# Patient Record
Sex: FEMALE
[De-identification: names, ages, dates, MRNs, and addresses within clinical notes are randomized per-mention and may not be internally consistent; named-entity substitution may affect disease eponyms.]

---

## 2021-10-21 ENCOUNTER — TRANSCRIPTION ENCOUNTER (OUTPATIENT)
Age: 61
End: 2021-10-21

## 2024-03-29 PROBLEM — Z00.00 ENCOUNTER FOR PREVENTIVE HEALTH EXAMINATION: Status: ACTIVE | Noted: 2024-03-29

## 2024-04-01 ENCOUNTER — APPOINTMENT (OUTPATIENT)
Dept: OBGYN | Facility: CLINIC | Age: 64
End: 2024-04-01
Payer: COMMERCIAL

## 2024-04-01 VITALS
SYSTOLIC BLOOD PRESSURE: 128 MMHG | DIASTOLIC BLOOD PRESSURE: 80 MMHG | HEIGHT: 63 IN | WEIGHT: 155 LBS | BODY MASS INDEX: 27.46 KG/M2

## 2024-04-01 DIAGNOSIS — Z01.419 ENCOUNTER FOR GYNECOLOGICAL EXAMINATION (GENERAL) (ROUTINE) W/OUT ABNORMAL FINDINGS: ICD-10-CM

## 2024-04-01 DIAGNOSIS — I10 ESSENTIAL (PRIMARY) HYPERTENSION: ICD-10-CM

## 2024-04-01 DIAGNOSIS — Z78.9 OTHER SPECIFIED HEALTH STATUS: ICD-10-CM

## 2024-04-01 DIAGNOSIS — Z82.3 FAMILY HISTORY OF STROKE: ICD-10-CM

## 2024-04-01 DIAGNOSIS — Z86.79 PERSONAL HISTORY OF OTHER DISEASES OF THE CIRCULATORY SYSTEM: ICD-10-CM

## 2024-04-01 PROCEDURE — 99386 PREV VISIT NEW AGE 40-64: CPT

## 2024-04-01 RX ORDER — LISINOPRIL 10 MG/1
10 TABLET ORAL
Refills: 0 | Status: ACTIVE | COMMUNITY

## 2024-04-01 RX ORDER — MULTIVIT-MIN/FOLIC/VIT K/LYCOP 400-300MCG
1000 TABLET ORAL
Refills: 0 | Status: ACTIVE | COMMUNITY

## 2024-04-01 RX ORDER — MULTIVITAMIN
TABLET ORAL
Refills: 0 | Status: ACTIVE | COMMUNITY

## 2024-04-01 RX ORDER — METOPROLOL SUCCINATE 50 MG/1
50 TABLET, EXTENDED RELEASE ORAL
Refills: 0 | Status: ACTIVE | COMMUNITY

## 2024-04-01 RX ORDER — CHROMIUM 200 MCG
1000 TABLET ORAL
Refills: 0 | Status: ACTIVE | COMMUNITY

## 2024-04-01 RX ORDER — PSYLLIUM HUSK 0.4 G
CAPSULE ORAL
Refills: 0 | Status: ACTIVE | COMMUNITY

## 2024-04-01 NOTE — PHYSICAL EXAM
[Appropriately responsive] : appropriately responsive [Alert] : alert [No Acute Distress] : no acute distress [No Lymphadenopathy] : no lymphadenopathy [Regular Rate Rhythm] : regular rate rhythm [No Murmurs] : no murmurs [Clear to Auscultation B/L] : clear to auscultation bilaterally [Soft] : soft [Non-distended] : non-distended [Non-tender] : non-tender [No HSM] : No HSM [No Mass] : no mass [No Lesions] : no lesions [Oriented x3] : oriented x3 [Examination Of The Breasts] : a normal appearance [No Masses] : no breast masses were palpable [Labia Majora] : normal [Normal] : normal [FreeTextEntry2] : No lesions seen [FreeTextEntry1] : No lesions seen [FreeTextEntry4] : Normal, no lesions seen or palpable [FreeTextEntry5] : smooth, pink, no lesions [FreeTextEntry6] : AV, small, mobile, NT, no adnexal masses, NT B/L

## 2024-04-01 NOTE — HISTORY OF PRESENT ILLNESS
[Mammogramdate] : 4/1/2023 [TextBox_19] : SMI- Normal [PapSmeardate] : 4/1/23 [TextBox_31] : Negative as per patient [BoneDensityDate] : 4/1/23 [TextBox_37] : Osteopenia, mild- SMI [ColonoscopyDate] : 1/1/2021 [PGHxTotal] : 2 [TextBox_43] : large, benign polyp- due q 3 years. Scheduled for 5/3/24 [[facility] [[facility] 2 [TextBox_6] : 1/1/2011 [FreeTextEntry1] : 1/1/2011

## 2024-04-05 LAB — CYTOLOGY CVX/VAG DOC THIN PREP: ABNORMAL

## 2025-06-19 ENCOUNTER — OFFICE (OUTPATIENT)
Facility: LOCATION | Age: 65
Setting detail: OPHTHALMOLOGY
End: 2025-06-19
Payer: MEDICARE

## 2025-06-19 VITALS — HEIGHT: 55 IN

## 2025-06-19 DIAGNOSIS — H16.222: ICD-10-CM

## 2025-06-19 DIAGNOSIS — H00.025: ICD-10-CM

## 2025-06-19 PROBLEM — H01.002 BLEPHARITIS; RIGHT UPPER LID, RIGHT LOWER LID, LEFT UPPER LID, LEFT LOWER LID: Status: ACTIVE | Noted: 2025-06-19

## 2025-06-19 PROBLEM — H10.432 CONJUNCTIVITIS CHRONIC FOLLICULAR; LEFT EYE: Status: ACTIVE | Noted: 2025-06-19

## 2025-06-19 PROBLEM — H11.152 PINGUECULA; LEFT EYE: Status: ACTIVE | Noted: 2025-06-19

## 2025-06-19 PROBLEM — H25.12 CATARACT SENILE NUCLEAR SCLEROSIS ;  , LEFT EYE: Status: ACTIVE | Noted: 2025-06-19

## 2025-06-19 PROBLEM — H01.004 BLEPHARITIS; RIGHT UPPER LID, RIGHT LOWER LID, LEFT UPPER LID, LEFT LOWER LID: Status: ACTIVE | Noted: 2025-06-19

## 2025-06-19 PROBLEM — H01.005 BLEPHARITIS; RIGHT UPPER LID, RIGHT LOWER LID, LEFT UPPER LID, LEFT LOWER LID: Status: ACTIVE | Noted: 2025-06-19

## 2025-06-19 PROBLEM — H01.001 BLEPHARITIS; RIGHT UPPER LID, RIGHT LOWER LID, LEFT UPPER LID, LEFT LOWER LID: Status: ACTIVE | Noted: 2025-06-19

## 2025-06-19 PROCEDURE — 99203 OFFICE O/P NEW LOW 30 MIN: CPT | Performed by: OPTOMETRIST

## 2025-06-19 ASSESSMENT — KERATOMETRY
OD_AXISANGLE_DEGREES: 35
OD_K1POWER_DIOPTERS: 45.25
OS_K1POWER_DIOPTERS: 45.50
OS_AXISANGLE_DEGREES: 153
OD_K2POWER_DIOPTERS: 45.50
OS_K2POWER_DIOPTERS: 46.00

## 2025-06-19 ASSESSMENT — REFRACTION_CURRENTRX
OS_SPHERE: +1.75
OS_VPRISM_DIRECTION: PROGS
OS_OVR_VA: 20/
OD_ADD: +2.25
OD_VPRISM_DIRECTION: PROGS
OS_CYLINDER: -2.75
OD_CYLINDER: -0.25
OD_AXIS: 106
OS_AXIS: 114
OS_ADD: +2.25
OD_OVR_VA: 20/
OD_SPHERE: +1.25

## 2025-06-19 ASSESSMENT — REFRACTION_MANIFEST
OS_SPHERE: +0.50
OD_CYLINDER: -0.75
OD_AXIS: 95
OD_SPHERE: +0.50
OU_VA: 20/20
OS_CYLINDER: -1.00
OD_ADD: +2.25
OD_VA1: 20/20
OS_ADD: +2.25
OS_AXIS: 085
OS_VA1: 20/20

## 2025-06-19 ASSESSMENT — REFRACTION_AUTOREFRACTION
OD_AXIS: 92
OS_AXIS: 79
OS_SPHERE: +0.50
OD_CYLINDER: -1.00
OD_SPHERE: +0.50
OS_CYLINDER: -1.25

## 2025-06-19 ASSESSMENT — CONFRONTATIONAL VISUAL FIELD TEST (CVF)
OD_FINDINGS: FULL
OS_FINDINGS: FULL

## 2025-06-19 ASSESSMENT — TONOMETRY
OS_IOP_MMHG: 16
OD_IOP_MMHG: 16

## 2025-06-19 ASSESSMENT — VISUAL ACUITY
OD_BCVA: 20/40-2
OS_BCVA: 20/25

## 2025-06-19 ASSESSMENT — LID EXAM ASSESSMENTS
OS_BLEPHARITIS: LLL LUL 2+
OD_BLEPHARITIS: RLL RUL 2+

## 2025-06-19 ASSESSMENT — SUPERFICIAL PUNCTATE KERATITIS (SPK)
OS_SPK: T
OD_SPK: T